# Patient Record
Sex: FEMALE | Race: WHITE | NOT HISPANIC OR LATINO | Employment: UNEMPLOYED | URBAN - METROPOLITAN AREA
[De-identification: names, ages, dates, MRNs, and addresses within clinical notes are randomized per-mention and may not be internally consistent; named-entity substitution may affect disease eponyms.]

---

## 2024-06-03 DIAGNOSIS — R10.2 PELVIC PAIN: Primary | ICD-10-CM

## 2024-06-05 ENCOUNTER — PROCEDURE VISIT (OUTPATIENT)
Dept: OBSTETRICS AND GYNECOLOGY | Facility: CLINIC | Age: 29
End: 2024-06-05
Payer: COMMERCIAL

## 2024-06-05 ENCOUNTER — OFFICE VISIT (OUTPATIENT)
Dept: OBSTETRICS AND GYNECOLOGY | Facility: CLINIC | Age: 29
End: 2024-06-05
Payer: COMMERCIAL

## 2024-06-05 VITALS
SYSTOLIC BLOOD PRESSURE: 142 MMHG | DIASTOLIC BLOOD PRESSURE: 94 MMHG | HEART RATE: 84 BPM | BODY MASS INDEX: 28.51 KG/M2 | WEIGHT: 182 LBS

## 2024-06-05 DIAGNOSIS — N92.0 MENORRHAGIA WITH REGULAR CYCLE: ICD-10-CM

## 2024-06-05 DIAGNOSIS — R10.2 PELVIC PAIN: ICD-10-CM

## 2024-06-05 DIAGNOSIS — R10.2 PELVIC PAIN: Primary | ICD-10-CM

## 2024-06-05 PROCEDURE — 99214 OFFICE O/P EST MOD 30 MIN: CPT | Mod: S$GLB,,, | Performed by: OBSTETRICS & GYNECOLOGY

## 2024-06-05 PROCEDURE — 3008F BODY MASS INDEX DOCD: CPT | Mod: CPTII,S$GLB,, | Performed by: OBSTETRICS & GYNECOLOGY

## 2024-06-05 PROCEDURE — 3077F SYST BP >= 140 MM HG: CPT | Mod: CPTII,S$GLB,, | Performed by: OBSTETRICS & GYNECOLOGY

## 2024-06-05 PROCEDURE — 76856 US EXAM PELVIC COMPLETE: CPT | Mod: S$GLB,,, | Performed by: OBSTETRICS & GYNECOLOGY

## 2024-06-05 PROCEDURE — 3080F DIAST BP >= 90 MM HG: CPT | Mod: CPTII,S$GLB,, | Performed by: OBSTETRICS & GYNECOLOGY

## 2024-06-05 PROCEDURE — 1159F MED LIST DOCD IN RCRD: CPT | Mod: CPTII,S$GLB,, | Performed by: OBSTETRICS & GYNECOLOGY

## 2024-06-05 RX ORDER — FLUOCINONIDE 1 MG/G
CREAM TOPICAL
COMMUNITY
Start: 2024-02-14

## 2024-06-05 RX ORDER — KETOCONAZOLE 20 MG/ML
SHAMPOO, SUSPENSION TOPICAL
COMMUNITY
Start: 2024-01-04

## 2024-06-05 RX ORDER — NORETHINDRONE AND ETHINYL ESTRADIOL AND FERROUS FUMARATE 0.8-25(24)
1 KIT ORAL DAILY
Qty: 28 TABLET | Refills: 12 | Status: SHIPPED | OUTPATIENT
Start: 2024-06-05 | End: 2025-06-05

## 2024-06-05 RX ORDER — KETOCONAZOLE 20 MG/G
CREAM TOPICAL
COMMUNITY
Start: 2024-01-04

## 2024-06-05 RX ORDER — CLOTRIMAZOLE AND BETAMETHASONE DIPROPIONATE 10; .64 MG/G; MG/G
CREAM TOPICAL
COMMUNITY
Start: 2024-01-04

## 2024-06-05 NOTE — PROGRESS NOTES
Subjective     Patient ID: Lucrecia Galloway is a 28 y.o. female.    Chief Complaint:  Pelvic Pain      History of Present Illness  Pelvic Pain  The patient's primary symptoms include pelvic pain. The patient's pertinent negatives include no vaginal discharge. Pertinent negatives include no abdominal pain, constipation, diarrhea, dysuria, flank pain, frequency, hematuria, nausea, urgency or vomiting. Her past medical history is significant for menorrhagia.     Patient presents today with complaints of pelvic pain the patient reports she has left-sided pain that is constant and all month long.  The patient had her IUD removed and reports her pain is much better however she still has pain every month all month long.  The patient reports she tried 1 birth control pill in the past and feels like her cycle got worse.  She is seeing BI and feels like she needs IVF    GYN & OB History  Patient's last menstrual period was 2024 (exact date).   Date of Last Pap: No result found    OB History    Para Term  AB Living   0             SAB IAB Ectopic Multiple Live Births                   Review of Systems  Review of Systems   Gastrointestinal:  Negative for abdominal pain, bloating, blood in stool, constipation, diarrhea, nausea, vomiting, reflux and fecal incontinence.   Genitourinary:  Positive for menorrhagia, menstrual problem and pelvic pain. Negative for bladder incontinence, decreased libido, dysmenorrhea, dyspareunia, dysuria, flank pain, frequency, genital sores, hematuria, hot flashes, urgency, vaginal bleeding, vaginal discharge, vaginal pain, urinary incontinence, postcoital bleeding, postmenopausal bleeding, vaginal dryness and vaginal odor.          Objective   Physical Exam:   Constitutional: Vital signs are normal. She appears well-developed and well-nourished.               Genitourinary:    Vagina and uterus normal.      Pelvic exam was performed with patient supine.     Labial bartholins  normal.Cervix is normal. Right adnexum displays no mass, no tenderness and no fullness. Left adnexum displays no mass, no tenderness and no fullness. No erythema, vaginal discharge, tenderness, bleeding, rectocele, cystocele or prolapse of vaginal walls in the vagina.    No foreign body in the vagina.      No signs of injury in the vagina.   Cervix exhibits no motion tenderness, no discharge and no friability.    Genitourinary Comments: Tenderness in the levator complex.                            Assessment and Plan     1. Pelvic pain    2. Menorrhagia with regular cycle            Plan:  Pelvic pain  -     noreth-ethinyl estradioL-iron 0.8mg-25mcg(24) and 75 mg (4) Chew; Take 1 tablet by mouth once daily.  Dispense: 28 tablet; Refill: 12    Menorrhagia with regular cycle  -     noreth-ethinyl estradioL-iron 0.8mg-25mcg(24) and 75 mg (4) Chew; Take 1 tablet by mouth once daily.  Dispense: 28 tablet; Refill: 12      We had extensive discussion regarding her previous findings on her scope which did not include endometriosis she also had a blocked tube on the right side.  The patient reports she is currently saving up for IVF as she has not have insurance coverage for this.  The patient feels much better after having her IUD removed but still has pain daily on the left side I feel like she could benefit from physical therapy for this as she may have some levator issues.  I feel like if this was menstrual cycle related she would not have pain every day.  The patient is willing to try a different oral contraceptive we also discussed the possibility of trying the ring or the patch.

## 2024-07-18 ENCOUNTER — PATIENT MESSAGE (OUTPATIENT)
Dept: OBSTETRICS AND GYNECOLOGY | Facility: CLINIC | Age: 29
End: 2024-07-18
Payer: COMMERCIAL

## 2024-07-26 ENCOUNTER — OFFICE VISIT (OUTPATIENT)
Dept: OBSTETRICS AND GYNECOLOGY | Facility: CLINIC | Age: 29
End: 2024-07-26
Payer: COMMERCIAL

## 2024-07-26 ENCOUNTER — PATIENT MESSAGE (OUTPATIENT)
Dept: OBSTETRICS AND GYNECOLOGY | Facility: CLINIC | Age: 29
End: 2024-07-26

## 2024-07-26 VITALS
WEIGHT: 171.81 LBS | HEART RATE: 89 BPM | BODY MASS INDEX: 26.97 KG/M2 | SYSTOLIC BLOOD PRESSURE: 133 MMHG | HEIGHT: 67 IN | DIASTOLIC BLOOD PRESSURE: 92 MMHG

## 2024-07-26 DIAGNOSIS — N93.0 PCB (POST COITAL BLEEDING): Primary | ICD-10-CM

## 2024-07-26 DIAGNOSIS — N84.1 CERVICAL POLYP: ICD-10-CM

## 2024-07-26 RX ORDER — METHYLPHENIDATE HYDROCHLORIDE 30 MG/1
CAPSULE, EXTENDED RELEASE ORAL
COMMUNITY
Start: 2024-07-22

## 2024-07-26 RX ORDER — FLUOXETINE HYDROCHLORIDE 20 MG/1
20 CAPSULE ORAL
COMMUNITY
Start: 2024-06-21

## 2024-07-26 NOTE — PROCEDURES
Procedures  The ring forceps was used to remove the 2 mm cervical polyp from the cervix after the area was cleaned with Betadine and silver nitrate was used to cauterize the underlying tissue

## 2024-07-26 NOTE — PROGRESS NOTES
Subjective     Patient ID: Lucrecia Galloway is a 28 y.o. female.    Chief Complaint:  Vaginal Injury      History of Present Illness  Vaginal Injury  The patient's pertinent negatives include no pelvic pain or vaginal discharge. Pertinent negatives include no abdominal pain, constipation, diarrhea, dysuria, flank pain, frequency, hematuria, nausea, urgency or vomiting. There is no history of menorrhagia.     Patient presents today with complaints of concern of a vaginal laceration.  The patient reports that she was having sexual relations back in May and there was a vaginal laceration that bled significantly however she reports now every time she has intercourse since she has some brown spotting and bleeding.    GYN & OB History  Patient's last menstrual period was 2024 (exact date).   Date of Last Pap: 2023    OB History    Para Term  AB Living   0             SAB IAB Ectopic Multiple Live Births                   Review of Systems  Review of Systems   Gastrointestinal:  Negative for abdominal pain, bloating, blood in stool, constipation, diarrhea, nausea, vomiting, reflux and fecal incontinence.   Genitourinary:  Positive for vaginal bleeding. Negative for bladder incontinence, decreased libido, dysmenorrhea, dyspareunia, dysuria, flank pain, frequency, genital sores, hematuria, hot flashes, menorrhagia, menstrual problem, pelvic pain, urgency, vaginal discharge, vaginal pain, urinary incontinence, postcoital bleeding, postmenopausal bleeding, vaginal dryness and vaginal odor.        Vaginal bleeding after intercourse          Objective   Physical Exam:   Constitutional: Vital signs are normal. She appears well-developed and well-nourished.               Genitourinary:    Vagina and uterus normal.      Pelvic exam was performed with patient supine.     Labial bartholins normal.Cervix is normal. Right adnexum displays no mass, no tenderness and no fullness. Left adnexum displays no mass, no  tenderness and no fullness. No erythema, vaginal discharge, tenderness, bleeding, rectocele, cystocele or prolapse of vaginal walls in the vagina.    No foreign body in the vagina.      No signs of injury in the vagina.   Cervix exhibits polyp. Cervix exhibits no motion tenderness, no discharge and no friability.    Genitourinary Comments: Thorough examination of the vagina and perineum shows no evidence of any lacerations older new however there was a small polyp on the cervix that was removed please see the procedure note                    Chaperone present           Assessment and Plan     1. PCB (post coital bleeding)    2. Cervical polyp            Plan:  PCB (post coital bleeding)    Cervical polyp      I discussed with the patient that this could be the cause of the brown spotting and bleeding after intercourse we will wait to see if she has bleeding after intercourse again once this has been removed.  I did ask that if she has continued bleeding after intercourse that she come in immediately after so I can see the source of the bleeding as she is currently not bleeding at this time.

## 2024-07-27 ENCOUNTER — NURSE TRIAGE (OUTPATIENT)
Dept: ADMINISTRATIVE | Facility: CLINIC | Age: 29
End: 2024-07-27
Payer: COMMERCIAL

## 2024-07-27 NOTE — TELEPHONE ENCOUNTER
Pt calling, pt had cervical polyp removed yesterday. Pt reports a small amount of bleeding, more than spotting but less than a period.  Pt reports intermittent abd cramping since procedure which she was told to expect. Pt calling to ensure that the amount of bleeding was normal after biopsy.     Dispo is for home care. Care advice given. Pt v/u.     Reason for Disposition   [1] MILD  bleeding or SPOTTING AND [2] after procedure (e.g., biopsy) or pelvic examination (e.g., pap smear) AND [3] < 7 days    Additional Information   Negative: Shock suspected (e.g., cold/pale/clammy skin, too weak to stand, low BP, rapid pulse)   Negative: Difficult to awaken or acting confused (e.g., disoriented, slurred speech)   Negative: Passed out (e.g., fainted, lost consciousness, blacked out and was not responding)   Negative: Sounds like a life-threatening emergency to the triager   Negative: SEVERE abdominal pain   Negative: SEVERE dizziness (e.g., unable to stand, requires support to walk, feels like passing out now)   Negative: SEVERE vaginal bleeding (e.g., soaking 2 pads or tampons per hour and present 2 or more hours; 1 menstrual cup every 2 hours)   Negative: Patient sounds very sick or weak to the triager   Negative: MODERATE vaginal bleeding (e.g., soaking 1 pad or tampon per hour and present > 6 hours; 1 menstrual cup every 6 hours)   Negative: [1] Constant abdominal pain AND [2] present > 2 hours     Cramping; was instructed that it would occur after procedure; intermittent   Negative: Pale skin (pallor) of new-onset or getting worse   Negative: Passed tissue (e.g., gray-white)   Negative: Taking Coumadin (warfarin) or other strong blood thinner, or known bleeding disorder (e.g., thrombocytopenia)   Negative: [1] Skin bruises or nosebleed AND [2] not caused by an injury   Negative: [1] Periods with > 6 soaked pads or tampons per day AND [2] last > 7 days   Negative: [1] Bleeding or spotting after procedure (e.g., biopsy)  or pelvic examination (e.g., pap smear) AND [2] lasts > 7 days   Negative: Periods with > 6 soaked pads or tampons per day   Negative: Periods last > 7 days   Negative: [1] Uses menstrual cups AND [2] more than 80 ml blood per menstrual period.   Negative: [1] Missed period AND [2] has occurred 2 or more times in the last year   Negative: [1] Menstrual cycle < 21 days OR > 35 days AND [2] occurs more than two cycles (2 months) this past year   Negative: [1] Bleeding or spotting between regular periods AND [2] occurs more than three cycles (3 months) this past year   Negative: [1] Bleeding or spotting between regular periods AND [2] occurs more than three cycles (3 months) AND [3] using birth control medicine (pills, patch, Depo-Provera, Implanon, vaginal ring, Mirena IUD)   Negative: Bleeding or spotting occurs after hysterectomy   Negative: Bleeding or spotting occurs after sex  (Exception: First intercourse.)     Pt has been evaluated for    Protocols used: Vaginal Bleeding - [id number]

## 2024-07-29 ENCOUNTER — PATIENT MESSAGE (OUTPATIENT)
Dept: OBSTETRICS AND GYNECOLOGY | Facility: CLINIC | Age: 29
End: 2024-07-29

## 2024-07-30 ENCOUNTER — PATIENT MESSAGE (OUTPATIENT)
Dept: OBSTETRICS AND GYNECOLOGY | Facility: CLINIC | Age: 29
End: 2024-07-30
Payer: COMMERCIAL

## 2024-09-06 ENCOUNTER — TELEPHONE (OUTPATIENT)
Dept: OBSTETRICS AND GYNECOLOGY | Facility: CLINIC | Age: 29
End: 2024-09-06
Payer: COMMERCIAL

## 2024-09-06 NOTE — TELEPHONE ENCOUNTER
Called pt she is still having bleeding after intercourse advised pt to report to the ER or urgent care

## 2024-09-06 NOTE — TELEPHONE ENCOUNTER
----- Message from Karo Rasmussen sent at 9/6/2024 11:13 AM CDT -----  Contact: pt  Type:  Same Day Appointment Request    Caller is requesting a same day appointment.  Caller first available appointment listed below.    Name of Caller:Lucrecia  When is the first available appointment?10/17/2024  Symptoms:bleeding during intercourse  Best Call Back Number:[phone]  Additional Information: none

## 2024-09-10 NOTE — TELEPHONE ENCOUNTER
----- Message from Mireille Funk sent at 9/10/2024 11:14 AM CDT -----   Patient is calling in regards to still waiting on call back please call her back at [phone]

## 2024-09-10 NOTE — TELEPHONE ENCOUNTER
Spoke with patient. She went to urgent care and they were suppose to call us and let us know that she needed to schedule an appt to get the polyp removed that came back. Patient is going out of the country and wants to get in asap. Scheduled her for when  comes back from medical leave. She also request something to help because she states it was painful the last time she got it removed. Sent request to .

## 2024-09-11 RX ORDER — DIAZEPAM 10 MG/1
TABLET ORAL
Qty: 1 TABLET | Refills: 0 | Status: SHIPPED | OUTPATIENT
Start: 2024-09-11

## 2024-09-27 ENCOUNTER — PROCEDURE VISIT (OUTPATIENT)
Dept: OBSTETRICS AND GYNECOLOGY | Facility: CLINIC | Age: 29
End: 2024-09-27
Payer: COMMERCIAL

## 2024-09-27 VITALS
HEART RATE: 71 BPM | DIASTOLIC BLOOD PRESSURE: 92 MMHG | BODY MASS INDEX: 25.3 KG/M2 | SYSTOLIC BLOOD PRESSURE: 136 MMHG | WEIGHT: 161.19 LBS | HEIGHT: 67 IN

## 2024-09-27 DIAGNOSIS — N84.1 CERVICAL POLYP: Primary | ICD-10-CM

## 2024-09-27 NOTE — PROCEDURES
Procedures  The cervical polyp was removed using the ring forceps and then the underlying tissue was cauterized using silver nitrate until hemostasis was noted in the specimen was sent to pathology

## 2024-09-30 ENCOUNTER — PATIENT MESSAGE (OUTPATIENT)
Dept: OBSTETRICS AND GYNECOLOGY | Facility: CLINIC | Age: 29
End: 2024-09-30
Payer: COMMERCIAL